# Patient Record
Sex: FEMALE | Race: WHITE | NOT HISPANIC OR LATINO | URBAN - METROPOLITAN AREA
[De-identification: names, ages, dates, MRNs, and addresses within clinical notes are randomized per-mention and may not be internally consistent; named-entity substitution may affect disease eponyms.]

---

## 2020-09-04 ENCOUNTER — IMPORTED ENCOUNTER (OUTPATIENT)
Dept: URBAN - METROPOLITAN AREA CLINIC 50 | Facility: CLINIC | Age: 60
End: 2020-09-04

## 2020-09-04 NOTE — PATIENT DISCUSSION
"""Patient fell and hit face in April. Will order cat scan to rule out fracture.  Patient has been using ""

## 2021-04-17 ASSESSMENT — VISUAL ACUITY
OS_CC: 20/40
OS_CC: J4
OD_BAT: 20/50
OS_OTHER: 20/60. >20/400.
OD_OTHER: 20/50. 20/50.
OD_CC: 20/30
OD_CC: J4
OS_BAT: 20/60

## 2021-04-17 ASSESSMENT — TONOMETRY
OS_IOP_MMHG: 16
OD_IOP_MMHG: 15